# Patient Record
Sex: MALE | Race: WHITE | Employment: FULL TIME | URBAN - METROPOLITAN AREA
[De-identification: names, ages, dates, MRNs, and addresses within clinical notes are randomized per-mention and may not be internally consistent; named-entity substitution may affect disease eponyms.]

---

## 2021-11-16 ENCOUNTER — ANESTHESIA EVENT (OUTPATIENT)
Dept: SURGERY | Age: 61
End: 2021-11-16
Payer: COMMERCIAL

## 2021-11-17 ENCOUNTER — APPOINTMENT (OUTPATIENT)
Dept: GENERAL RADIOLOGY | Age: 61
End: 2021-11-17
Attending: ANESTHESIOLOGY
Payer: COMMERCIAL

## 2021-11-17 ENCOUNTER — HOSPITAL ENCOUNTER (OUTPATIENT)
Age: 61
Setting detail: OUTPATIENT SURGERY
Discharge: HOME | End: 2021-11-17
Attending: OPHTHALMOLOGY | Admitting: OPHTHALMOLOGY
Payer: COMMERCIAL

## 2021-11-17 ENCOUNTER — ANESTHESIA (OUTPATIENT)
Dept: SURGERY | Age: 61
End: 2021-11-17
Payer: COMMERCIAL

## 2021-11-17 VITALS
DIASTOLIC BLOOD PRESSURE: 66 MMHG | WEIGHT: 240 LBS | OXYGEN SATURATION: 100 % | RESPIRATION RATE: 17 BRPM | SYSTOLIC BLOOD PRESSURE: 127 MMHG | TEMPERATURE: 97.5 F | HEART RATE: 87 BPM | BODY MASS INDEX: 32.55 KG/M2

## 2021-11-17 LAB
GLUCOSE BLD STRIP.AUTO-MCNC: 214 MG/DL (ref 65–100)
SERVICE CMNT-IMP: ABNORMAL

## 2021-11-17 PROCEDURE — 77030018838 HC SOL IRR OPTH ALCN -B: Performed by: OPHTHALMOLOGY

## 2021-11-17 PROCEDURE — 2709999900 HC NON-CHARGEABLE SUPPLY: Performed by: OPHTHALMOLOGY

## 2021-11-17 PROCEDURE — 77030033576 HC PK VITRCMY TOT PLS ALCN -F: Performed by: OPHTHALMOLOGY

## 2021-11-17 PROCEDURE — 77030038275 HC DEV VIEW LENS DISP OCLS -B: Performed by: OPHTHALMOLOGY

## 2021-11-17 PROCEDURE — 77030003615 HC NDL OPHTH B&L -A: Performed by: OPHTHALMOLOGY

## 2021-11-17 PROCEDURE — 71045 X-RAY EXAM CHEST 1 VIEW: CPT

## 2021-11-17 PROCEDURE — 74011000636 HC RX REV CODE- 636: Performed by: OPHTHALMOLOGY

## 2021-11-17 PROCEDURE — 77030019908 HC STETH ESOPH SIMS -A: Performed by: ANESTHESIOLOGY

## 2021-11-17 PROCEDURE — 74011000250 HC RX REV CODE- 250: Performed by: NURSE ANESTHETIST, CERTIFIED REGISTERED

## 2021-11-17 PROCEDURE — 76210000020 HC REC RM PH II FIRST 0.5 HR: Performed by: OPHTHALMOLOGY

## 2021-11-17 PROCEDURE — 77030032623 HC KT VITRCMY TOT PLS ALCN -F: Performed by: OPHTHALMOLOGY

## 2021-11-17 PROCEDURE — 74011250636 HC RX REV CODE- 250/636: Performed by: OPHTHALMOLOGY

## 2021-11-17 PROCEDURE — 76010000172 HC OR TIME 2.5 TO 3 HR INTENSV-TIER 1: Performed by: OPHTHALMOLOGY

## 2021-11-17 PROCEDURE — 77030037088 HC TUBE ENDOTRACH ORAL NSL COVD-A: Performed by: ANESTHESIOLOGY

## 2021-11-17 PROCEDURE — 74011250637 HC RX REV CODE- 250/637: Performed by: ANESTHESIOLOGY

## 2021-11-17 PROCEDURE — 74011250636 HC RX REV CODE- 250/636: Performed by: NURSE ANESTHETIST, CERTIFIED REGISTERED

## 2021-11-17 PROCEDURE — 74011250636 HC RX REV CODE- 250/636

## 2021-11-17 PROCEDURE — 94002 VENT MGMT INPAT INIT DAY: CPT

## 2021-11-17 PROCEDURE — 74011000250 HC RX REV CODE- 250: Performed by: OPHTHALMOLOGY

## 2021-11-17 PROCEDURE — 77030020268 HC MISC GENERAL SUPPLY: Performed by: OPHTHALMOLOGY

## 2021-11-17 PROCEDURE — 77030039425 HC BLD LARYNG TRULITE DISP TELE -A: Performed by: ANESTHESIOLOGY

## 2021-11-17 PROCEDURE — 82962 GLUCOSE BLOOD TEST: CPT

## 2021-11-17 PROCEDURE — 76210000001 HC OR PH I REC 2.5 TO 3 HR: Performed by: OPHTHALMOLOGY

## 2021-11-17 PROCEDURE — 74011250636 HC RX REV CODE- 250/636: Performed by: ANESTHESIOLOGY

## 2021-11-17 PROCEDURE — 77030008547 HC TBNG OPHTH BD -A: Performed by: OPHTHALMOLOGY

## 2021-11-17 PROCEDURE — 77030021678 HC GLIDESCP STAT DISP VERT -B: Performed by: ANESTHESIOLOGY

## 2021-11-17 PROCEDURE — 76060000036 HC ANESTHESIA 2.5 TO 3 HR: Performed by: OPHTHALMOLOGY

## 2021-11-17 PROCEDURE — 77030032655 HC PRB LSR FLX ILLUM ALCON -C: Performed by: OPHTHALMOLOGY

## 2021-11-17 PROCEDURE — 77030040740 HC PK VITRCMY ULTRVIT ALCN -D: Performed by: OPHTHALMOLOGY

## 2021-11-17 RX ORDER — ACETAMINOPHEN 500 MG
1000 TABLET ORAL ONCE
Status: COMPLETED | OUTPATIENT
Start: 2021-11-17 | End: 2021-11-17

## 2021-11-17 RX ORDER — MIDAZOLAM HYDROCHLORIDE 1 MG/ML
2 INJECTION, SOLUTION INTRAMUSCULAR; INTRAVENOUS
Status: COMPLETED | OUTPATIENT
Start: 2021-11-17 | End: 2021-11-17

## 2021-11-17 RX ORDER — MIDAZOLAM HYDROCHLORIDE 1 MG/ML
INJECTION, SOLUTION INTRAMUSCULAR; INTRAVENOUS AS NEEDED
Status: DISCONTINUED | OUTPATIENT
Start: 2021-11-17 | End: 2021-11-17 | Stop reason: HOSPADM

## 2021-11-17 RX ORDER — LIDOCAINE HYDROCHLORIDE 20 MG/ML
INJECTION, SOLUTION EPIDURAL; INFILTRATION; INTRACAUDAL; PERINEURAL AS NEEDED
Status: DISCONTINUED | OUTPATIENT
Start: 2021-11-17 | End: 2021-11-17 | Stop reason: HOSPADM

## 2021-11-17 RX ORDER — SUCCINYLCHOLINE CHLORIDE 20 MG/ML
INJECTION INTRAMUSCULAR; INTRAVENOUS AS NEEDED
Status: DISCONTINUED | OUTPATIENT
Start: 2021-11-17 | End: 2021-11-17 | Stop reason: HOSPADM

## 2021-11-17 RX ORDER — SODIUM CHLORIDE, SODIUM LACTATE, POTASSIUM CHLORIDE, CALCIUM CHLORIDE 600; 310; 30; 20 MG/100ML; MG/100ML; MG/100ML; MG/100ML
75 INJECTION, SOLUTION INTRAVENOUS CONTINUOUS
Status: DISCONTINUED | OUTPATIENT
Start: 2021-11-17 | End: 2021-11-17 | Stop reason: HOSPADM

## 2021-11-17 RX ORDER — BETAMETHASONE SODIUM PHOSPHATE AND BETAMETHASONE ACETATE 3; 3 MG/ML; MG/ML
INJECTION, SUSPENSION INTRA-ARTICULAR; INTRALESIONAL; INTRAMUSCULAR; SOFT TISSUE AS NEEDED
Status: DISCONTINUED | OUTPATIENT
Start: 2021-11-17 | End: 2021-11-17 | Stop reason: HOSPADM

## 2021-11-17 RX ORDER — SODIUM CHLORIDE, SODIUM LACTATE, POTASSIUM CHLORIDE, CALCIUM CHLORIDE 600; 310; 30; 20 MG/100ML; MG/100ML; MG/100ML; MG/100ML
100 INJECTION, SOLUTION INTRAVENOUS CONTINUOUS
Status: DISCONTINUED | OUTPATIENT
Start: 2021-11-17 | End: 2021-11-17 | Stop reason: HOSPADM

## 2021-11-17 RX ORDER — EPHEDRINE SULFATE/0.9% NACL/PF 50 MG/5 ML
SYRINGE (ML) INTRAVENOUS AS NEEDED
Status: DISCONTINUED | OUTPATIENT
Start: 2021-11-17 | End: 2021-11-17 | Stop reason: HOSPADM

## 2021-11-17 RX ORDER — SODIUM CHLORIDE 9 MG/ML
INJECTION INTRAMUSCULAR; INTRAVENOUS; SUBCUTANEOUS AS NEEDED
Status: DISCONTINUED | OUTPATIENT
Start: 2021-11-17 | End: 2021-11-17 | Stop reason: HOSPADM

## 2021-11-17 RX ORDER — LIDOCAINE HYDROCHLORIDE 40 MG/ML
INJECTION, SOLUTION RETROBULBAR; TOPICAL AS NEEDED
Status: DISCONTINUED | OUTPATIENT
Start: 2021-11-17 | End: 2021-11-17 | Stop reason: HOSPADM

## 2021-11-17 RX ORDER — BUPIVACAINE HYDROCHLORIDE 7.5 MG/ML
INJECTION, SOLUTION EPIDURAL; RETROBULBAR AS NEEDED
Status: DISCONTINUED | OUTPATIENT
Start: 2021-11-17 | End: 2021-11-17 | Stop reason: HOSPADM

## 2021-11-17 RX ORDER — DIPHENHYDRAMINE HYDROCHLORIDE 50 MG/ML
12.5 INJECTION, SOLUTION INTRAMUSCULAR; INTRAVENOUS
Status: DISCONTINUED | OUTPATIENT
Start: 2021-11-17 | End: 2021-11-17 | Stop reason: HOSPADM

## 2021-11-17 RX ORDER — FENTANYL CITRATE 50 UG/ML
INJECTION, SOLUTION INTRAMUSCULAR; INTRAVENOUS AS NEEDED
Status: DISCONTINUED | OUTPATIENT
Start: 2021-11-17 | End: 2021-11-17 | Stop reason: HOSPADM

## 2021-11-17 RX ORDER — NALOXONE HYDROCHLORIDE 0.4 MG/ML
0.1 INJECTION, SOLUTION INTRAMUSCULAR; INTRAVENOUS; SUBCUTANEOUS
Status: DISCONTINUED | OUTPATIENT
Start: 2021-11-17 | End: 2021-11-17 | Stop reason: HOSPADM

## 2021-11-17 RX ORDER — ATROPINE SULFATE 10 MG/ML
1 SOLUTION/ DROPS OPHTHALMIC
Status: COMPLETED | OUTPATIENT
Start: 2021-11-17 | End: 2021-11-17

## 2021-11-17 RX ORDER — TROPICAMIDE 10 MG/ML
1 SOLUTION/ DROPS OPHTHALMIC
Status: COMPLETED | OUTPATIENT
Start: 2021-11-17 | End: 2021-11-17

## 2021-11-17 RX ORDER — HALOPERIDOL 5 MG/ML
1 INJECTION INTRAMUSCULAR
Status: DISCONTINUED | OUTPATIENT
Start: 2021-11-17 | End: 2021-11-17 | Stop reason: HOSPADM

## 2021-11-17 RX ORDER — LIDOCAINE HYDROCHLORIDE 10 MG/ML
0.1 INJECTION INFILTRATION; PERINEURAL AS NEEDED
Status: DISCONTINUED | OUTPATIENT
Start: 2021-11-17 | End: 2021-11-17 | Stop reason: HOSPADM

## 2021-11-17 RX ORDER — PROPOFOL 10 MG/ML
INJECTION, EMULSION INTRAVENOUS
Status: COMPLETED
Start: 2021-11-17 | End: 2021-11-17

## 2021-11-17 RX ORDER — ROCURONIUM BROMIDE 10 MG/ML
INJECTION, SOLUTION INTRAVENOUS AS NEEDED
Status: DISCONTINUED | OUTPATIENT
Start: 2021-11-17 | End: 2021-11-17 | Stop reason: HOSPADM

## 2021-11-17 RX ORDER — HYDROMORPHONE HYDROCHLORIDE 2 MG/ML
0.5 INJECTION, SOLUTION INTRAMUSCULAR; INTRAVENOUS; SUBCUTANEOUS
Status: DISCONTINUED | OUTPATIENT
Start: 2021-11-17 | End: 2021-11-17 | Stop reason: HOSPADM

## 2021-11-17 RX ORDER — PHENYLEPHRINE HYDROCHLORIDE 25 MG/ML
1 SOLUTION/ DROPS OPHTHALMIC
Status: COMPLETED | OUTPATIENT
Start: 2021-11-17 | End: 2021-11-17

## 2021-11-17 RX ORDER — MIDAZOLAM HYDROCHLORIDE 1 MG/ML
INJECTION, SOLUTION INTRAMUSCULAR; INTRAVENOUS
Status: COMPLETED
Start: 2021-11-17 | End: 2021-11-17

## 2021-11-17 RX ORDER — PROPOFOL 10 MG/ML
0-100 INJECTION, EMULSION INTRAVENOUS
Status: DISCONTINUED | OUTPATIENT
Start: 2021-11-17 | End: 2021-11-17 | Stop reason: HOSPADM

## 2021-11-17 RX ORDER — FLUMAZENIL 0.1 MG/ML
0.2 INJECTION INTRAVENOUS
Status: DISCONTINUED | OUTPATIENT
Start: 2021-11-17 | End: 2021-11-17 | Stop reason: HOSPADM

## 2021-11-17 RX ORDER — OXYCODONE HYDROCHLORIDE 5 MG/1
5 TABLET ORAL
Status: COMPLETED | OUTPATIENT
Start: 2021-11-17 | End: 2021-11-17

## 2021-11-17 RX ORDER — CEFAZOLIN SODIUM 1 G/3ML
INJECTION, POWDER, FOR SOLUTION INTRAMUSCULAR; INTRAVENOUS AS NEEDED
Status: DISCONTINUED | OUTPATIENT
Start: 2021-11-17 | End: 2021-11-17 | Stop reason: HOSPADM

## 2021-11-17 RX ORDER — PROPOFOL 10 MG/ML
INJECTION, EMULSION INTRAVENOUS AS NEEDED
Status: DISCONTINUED | OUTPATIENT
Start: 2021-11-17 | End: 2021-11-17 | Stop reason: HOSPADM

## 2021-11-17 RX ORDER — DEXAMETHASONE SODIUM PHOSPHATE 4 MG/ML
INJECTION, SOLUTION INTRA-ARTICULAR; INTRALESIONAL; INTRAMUSCULAR; INTRAVENOUS; SOFT TISSUE AS NEEDED
Status: DISCONTINUED | OUTPATIENT
Start: 2021-11-17 | End: 2021-11-17 | Stop reason: HOSPADM

## 2021-11-17 RX ADMIN — MIDAZOLAM 1 MG: 1 INJECTION INTRAMUSCULAR; INTRAVENOUS at 11:35

## 2021-11-17 RX ADMIN — FENTANYL CITRATE 100 MCG: 50 INJECTION INTRAMUSCULAR; INTRAVENOUS at 09:24

## 2021-11-17 RX ADMIN — Medication 10 MG: at 09:39

## 2021-11-17 RX ADMIN — SODIUM CHLORIDE, SODIUM LACTATE, POTASSIUM CHLORIDE, AND CALCIUM CHLORIDE 100 ML/HR: 600; 310; 30; 20 INJECTION, SOLUTION INTRAVENOUS at 08:12

## 2021-11-17 RX ADMIN — PHENYLEPHRINE HYDROCHLORIDE 100 MCG: 10 INJECTION INTRAVENOUS at 10:07

## 2021-11-17 RX ADMIN — SUGAMMADEX 200 MG: 100 INJECTION, SOLUTION INTRAVENOUS at 11:37

## 2021-11-17 RX ADMIN — ATROPINE SULFATE 1 DROP: 10 SOLUTION/ DROPS OPHTHALMIC at 08:04

## 2021-11-17 RX ADMIN — PROPOFOL 40 MG: 10 INJECTION, EMULSION INTRAVENOUS at 11:57

## 2021-11-17 RX ADMIN — PHENOL 1 SPRAY: 1.5 LIQUID ORAL at 14:29

## 2021-11-17 RX ADMIN — PHENYLEPHRINE HYDROCHLORIDE 50 MCG: 10 INJECTION INTRAVENOUS at 09:26

## 2021-11-17 RX ADMIN — PHENYLEPHRINE HYDROCHLORIDE 100 MCG: 10 INJECTION INTRAVENOUS at 10:39

## 2021-11-17 RX ADMIN — Medication 10 MG: at 10:21

## 2021-11-17 RX ADMIN — PHENYLEPHRINE HYDROCHLORIDE 100 MCG: 10 INJECTION INTRAVENOUS at 09:49

## 2021-11-17 RX ADMIN — Medication 10 MG: at 10:39

## 2021-11-17 RX ADMIN — SUCCINYLCHOLINE CHLORIDE 140 MG: 20 INJECTION, SOLUTION INTRAMUSCULAR; INTRAVENOUS at 09:25

## 2021-11-17 RX ADMIN — PHENYLEPHRINE HYDROCHLORIDE 1 DROP: 25 SOLUTION/ DROPS OPHTHALMIC at 08:04

## 2021-11-17 RX ADMIN — MIDAZOLAM 2 MG: 1 INJECTION INTRAMUSCULAR; INTRAVENOUS at 12:32

## 2021-11-17 RX ADMIN — TROPICAMIDE 1 DROP: 10 SOLUTION/ DROPS OPHTHALMIC at 07:59

## 2021-11-17 RX ADMIN — ACETAMINOPHEN 1000 MG: 500 TABLET ORAL at 08:12

## 2021-11-17 RX ADMIN — Medication 10 MG: at 09:55

## 2021-11-17 RX ADMIN — PHENYLEPHRINE HYDROCHLORIDE 100 MCG: 10 INJECTION INTRAVENOUS at 10:21

## 2021-11-17 RX ADMIN — ATROPINE SULFATE 1 DROP: 10 SOLUTION/ DROPS OPHTHALMIC at 07:59

## 2021-11-17 RX ADMIN — PHENYLEPHRINE HYDROCHLORIDE 150 MCG: 10 INJECTION INTRAVENOUS at 09:43

## 2021-11-17 RX ADMIN — TROPICAMIDE 1 DROP: 10 SOLUTION/ DROPS OPHTHALMIC at 08:09

## 2021-11-17 RX ADMIN — Medication 10 MG: at 09:43

## 2021-11-17 RX ADMIN — PHENYLEPHRINE HYDROCHLORIDE 1 DROP: 25 SOLUTION/ DROPS OPHTHALMIC at 07:59

## 2021-11-17 RX ADMIN — SODIUM CHLORIDE, SODIUM LACTATE, POTASSIUM CHLORIDE, AND CALCIUM CHLORIDE: 600; 310; 30; 20 INJECTION, SOLUTION INTRAVENOUS at 11:43

## 2021-11-17 RX ADMIN — MIDAZOLAM HYDROCHLORIDE 2 MG: 1 INJECTION, SOLUTION INTRAMUSCULAR; INTRAVENOUS at 12:32

## 2021-11-17 RX ADMIN — PHENYLEPHRINE HYDROCHLORIDE 100 MCG: 10 INJECTION INTRAVENOUS at 09:55

## 2021-11-17 RX ADMIN — PROPOFOL 40 MCG/KG/MIN: 10 INJECTION, EMULSION INTRAVENOUS at 12:25

## 2021-11-17 RX ADMIN — Medication 10 MG: at 09:49

## 2021-11-17 RX ADMIN — Medication 10 MG: at 10:07

## 2021-11-17 RX ADMIN — OXYCODONE 5 MG: 5 TABLET ORAL at 14:42

## 2021-11-17 RX ADMIN — ROCURONIUM BROMIDE 5 MG: 10 INJECTION, SOLUTION INTRAVENOUS at 09:25

## 2021-11-17 RX ADMIN — MIDAZOLAM 1 MG: 1 INJECTION INTRAMUSCULAR; INTRAVENOUS at 11:31

## 2021-11-17 RX ADMIN — PHENYLEPHRINE HYDROCHLORIDE 1 DROP: 25 SOLUTION/ DROPS OPHTHALMIC at 08:09

## 2021-11-17 RX ADMIN — MIDAZOLAM 2 MG: 1 INJECTION INTRAMUSCULAR; INTRAVENOUS at 09:00

## 2021-11-17 RX ADMIN — LIDOCAINE HYDROCHLORIDE 100 MG: 20 INJECTION, SOLUTION EPIDURAL; INFILTRATION; INTRACAUDAL; PERINEURAL at 09:25

## 2021-11-17 RX ADMIN — DEXAMETHASONE SODIUM PHOSPHATE 4 MG: 4 INJECTION, SOLUTION INTRAMUSCULAR; INTRAVENOUS at 09:54

## 2021-11-17 RX ADMIN — PROPOFOL 30 MCG/KG/MIN: 10 INJECTION, EMULSION INTRAVENOUS at 12:15

## 2021-11-17 RX ADMIN — HYDROMORPHONE HYDROCHLORIDE 0.5 MG: 2 INJECTION INTRAMUSCULAR; INTRAVENOUS; SUBCUTANEOUS at 14:14

## 2021-11-17 RX ADMIN — TROPICAMIDE 1 DROP: 10 SOLUTION/ DROPS OPHTHALMIC at 08:04

## 2021-11-17 RX ADMIN — PROPOFOL 160 MG: 10 INJECTION, EMULSION INTRAVENOUS at 09:25

## 2021-11-17 NOTE — DISCHARGE INSTRUCTIONS
[facility], [address] [place] [address] MD Evans Salas MD Kimber Quiver. MD Viraj Limon. Joan Freedman MD    [phone] or [phone] or [phone] or [phone]    Post Operative Instructions for Retina and Vitreous Surgery Patients    The following are a few guidelines that you should observe for two weeks after surgery:    1. Avoid stooping over, lifting heavy weights or bumping your head. 2.  Special positioning: DO NOT LAY FLAT ON YOUR BACK, SLEEP ON EITHER SIDE. 3.  No extensive traveling except what is necessary. 4.  You may watch television, read, cook and wash dishes as long as it does not interfere        with special positioning instructions. 5.  No strenuous activity or sexual intercourse. 6.  Some discharge from the operated eye is to be expected. This should gradually        improve. 7.  Wear the eye shield or glasses at all times until cleared by the doctor. 8.  If you experience increasing pain, decreasing vision, or pus like discharge, call the      Office. 9. BRING ALL EYE DROPS TO YOU POSTOPERATIVE APPOINTMENT! Medication Interaction:  During your procedure you potentially received a medication or medications which may reduce the effectiveness of oral contraceptives. Please consider other forms of contraception for 1 month following your procedure if you are currently using oral contraceptives as your primary form of birth control. In addition to this, we recommend continuing your oral contraceptive as prescribed, unless otherwise instructed by your physician, during this time.     After general anesthesia or intravenous sedation, for 24 hours or while taking prescription Narcotics:  · Limit your activities  · A responsible adult needs to be with you for the next 24 hours  · Do not drive and operate hazardous machinery  · Do not make important personal or business decisions  · Do not drink alcoholic beverages  · If you have not urinated within 8 hours after discharge, and you are experiencing discomfort from urinary retention, please go to the nearest ED. · If you have sleep apnea and have a CPAP machine, please use it for all naps and sleeping. · Please use caution when taking narcotics and any of your home medications that may cause drowsiness. *  Please give a list of your current medications to your Primary Care Provider. *  Please update this list whenever your medications are discontinued, doses are      changed, or new medications (including over-the-counter products) are added. *  Please carry medication information at all times in case of emergency situations. These are general instructions for a healthy lifestyle:  No smoking/ No tobacco products/ Avoid exposure to second hand smoke  Surgeon General's Warning:  Quitting smoking now greatly reduces serious risk to your health. Obesity, smoking, and sedentary lifestyle greatly increases your risk for illness  A healthy diet, regular physical exercise & weight monitoring are important for maintaining a healthy lifestyle    You may be retaining fluid if you have a history of heart failure or if you experience any of the following symptoms:  Weight gain of 3 pounds or more overnight or 5 pounds in a week, increased swelling in our hands or feet or shortness of breath while lying flat in bed. Please call your doctor as soon as you notice any of these symptoms; do not wait until your next office visit.

## 2021-11-17 NOTE — PROGRESS NOTES
Patient out from operating room and placed on the ventilator on documented settings. Patient is orally intubated with a # 8.0 ET Tube secured at the 25 cm latisha at the lip. Breath sounds are coarse. Trachea is midline. Negative for subcutaneous air, chest excursion is symmetric. Negative for pitting edema. Patient is also Negative for cyanosis. All alarms are set and audible. Resuscitation bag is at the head of the bed.       Ventilator Settings  Mode FIO2 Rate Tidal Volume Pressure PEEP I:E Ratio   Assist control, VC+  50 %    450 ml     8 cm H20         Peak airway pressure: 16 cm H2O   Minute ventilation: 11.1 l/min       Edu Ladd, RT

## 2021-11-17 NOTE — ANESTHESIA PREPROCEDURE EVALUATION
Anesthetic History   No history of anesthetic complications            Review of Systems / Medical History  Patient summary reviewed and pertinent labs reviewed    Pulmonary          Smoker (40+ pack year hx)         Neuro/Psych   Within defined limits           Cardiovascular    Hypertension: well controlled          PAD (Stents x 4 in LE. On Brilinta, but held perioperatively.)    Exercise tolerance: >4 METS  Comments: Denies any CP/SOB/Palpitations. GI/Hepatic/Renal  Within defined limits              Endo/Other    Diabetes: poorly controlled, type 2         Other Findings            Physical Exam    Airway  Mallampati: II  TM Distance: > 6 cm  Neck ROM: normal range of motion   Mouth opening: Normal    Comments: Full Beard Cardiovascular          Murmur: Grade 2, Aortic area     Dental  No notable dental hx       Pulmonary  Breath sounds clear to auscultation               Abdominal  GI exam deferred       Other Findings            Anesthetic Plan    ASA: 3  Anesthesia type: general          Induction: Intravenous  Anesthetic plan and risks discussed with: Patient      Plan for GETA. Discussed with patient that he needs to mention heart murmur to his PCP and be evaluated with echo.

## 2021-11-17 NOTE — OP NOTES
Operative Note    Patient: Yusra Bai  YOB: 1960  MRN: [id number]    Date of Procedure: 11/17/2021     Pre-Op Diagnosis:   1. Left epiretinal membrane [H35.372]  2. Proliferative diabetic retinopathy of left eye with macular edema associated with diabetes mellitus due to underlying condition ([facility] [E08.3512]  3. 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 Procedure(s):  LEFT VITRECTOMY POSTERIOR 25 GAUGE   LEFT EYE ENDOLASER/   LEFT EYE SCAR TISSUE REMOVAL - TRD repair  LEFT EYE ERM/ILM PEELING    Surgeon(s):  Brendan Parker MD    Surgical Assistant: None    Anesthesia: General     Estimated Blood Loss (mL): Minimal    Complications: None    Specimens: * No specimens in log *     Implants: * No implants in log *    Drains: * No LDAs found *    Findings: see note. DESCRIPTION OF THE PROCEDURE:  The patient and his wife were met in the preoperative holding area where the operative eye was marked. Informed consent was obtained and the patient was taken back to the operating room where a time-out was called confirming the proper patient, operative site, as well as procedure to be performed. Next, general anesthesia was obtained with the assistance of the anesthesiology team and the patient's eye was then prepped and draped in the usual sterile ophthalmic fashion, an eyelid speculum was placed between the eyelids of the operative eye. A cut down block was created inferonasally with ella scissors and a retrobulbar block consisting of a 50:50 mixture of 0.75% Marcaine and 4% lidocaine was administered on 19 gauge kevin molina cannula. A total of 3 mL was injected around the eye without any complications. An operating microscope outfitted with a [employer] viewing system was utilized for visualization throughout the case.   First, 25-gauge pars plana vitrectomy trocars were placed inferotemporally, superotemporally, and superonasally, 4 mm posterior to the limbus margin. The infusion was placed in the inferotemporal cannula and visualized directly prior to being turned on. The infusion line was then secured with steri-strips to the drape. Next, core vitrectomy was performed and examination during this time revealed proliferative diabetic retinopathy with tractional membranes emanating off the optic nerve into the superior arcade and nasally. There was tractional retinal detachment present. All anterior to posterior traction was then relieved 360 degrees with the vitrectomy cutter. Careful shaving of the vitreous base was then performed 360 degrees with the assistance of scleral depression. Next all fibrovascular tractional membranes and plaques were carefully sectioned, delaminated and removed from the surface of the retina using foreceps and the vitrectomy cutter. The traction was relieved nicely as a result. The hyaloid was elevated to the far periphery in all quadrants. Examination throughout this time revealed a dense epiretinal membrane with macular puckering overlying the posterior pole. Tissue Blue was infused into the eye. It was allowed to layer on the posterior pole of the eye for approximately 30 seconds before being completely flushed out. Next, a flat lens was placed on the eye and utilized for visualization throughout membrane peeling of the posterior pole. Sydney forceps were then utilized to carefully peel the ILM and ERM over the macula in a wide-margin fashion. This was done without any complications. Endolaser was then placed around all retinal breaks and areas of suspicion as well as 360 degrees at a power of 250-300 milliwatts, 100 milliseconds duration and 150 millisecond interval.  A total of 1120 laser spots were placed. The remainder of the scleral depressed examination was unremarkable. A partial air fluid exchange was then performed.  The cannulas were removed, checked, and found to be air tight.  The patient's intraocular pressure was found to be appropriate. Subconjunctival Ancef and betamethasone was then injected around the eye and the patient's eye was cleaned, patched and shielded with antibiotic ointment placed in the operative eye. The patient was awakened from surgery under the close monitoring of the anesthesiology team.  Of note, his anesthetic was very sluggish to resolve. The patient was then taken back to the recovery room in stable condition. They tolerated the procedure well without any complications.       Rhett Huerta MD        Electronically Signed by Natacha Manzano MD on 2021 at 11:48 AM

## 2021-11-17 NOTE — ANESTHESIA POSTPROCEDURE EVALUATION
Procedure(s):  LEFT VITRECTOMY POSTERIOR 25 GAUGE / LASER/ SCAR TISSUE REMOVAL. 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 See intraop quick notes for details. I had to keep him intubated for several hours after the surgery due to weakness. Very faint orbicularis occuli twitch and no ulnar twitch. (Checked several TOF monitors, changed batteries, and tested them on myself to make sure they were working). Anesthetic had been fully reversed and patient was making no spontaneous respiratory effort and seemed weak when trying to respond to commands. I sent a dibucaine number but it is a send out lab so won't result for 3-4 days per lab. VS were stable, patient was maintained on Propofol gtt with intermittent Versed boluses for amnesia in PACU. He was weaned to extubation several hours after the procedure. Pain is controlled, he has his full motor strength back and is safe for discharge. I discussed with patient and wife the events that transpired and the actions we took. I also prvoided them with a printout of pseudocholinesterase deficiency and will followup with a phone call once the lab has resulted.   Post anesthesia nausea and vomiting:  none      INITIAL Post-op Vital signs:   Vitals Value Taken Time   /63 11/17/21 1455   Temp 36.4 °C (97.5 °F) 11/17/21 1451   Pulse 81 11/17/21 1459   Resp 17 11/17/21 1451   SpO2 95 % 11/17/21 1459   Vitals shown include unvalidated device data.

## 2021-11-22 NOTE — PROGRESS NOTES
Opened patient's chart to follow-up on lab test for the Dibucaine number for Pseudocholinesterase Deficiency. Lab shows inadequate sample and that the test was cancelled. Called PAT to arrange for an outpatient lab draw and then called patient tonight to discuss and confirm. Patient mentioned that he finally got a hold of his sister and she had a surgery where she was \"paralyzed but could hear people talking\" which may also lend credence to a diagnosis of Hereditary Pseudocholinesterase Deficiency. As of my phone call with the patient, he had not heard from PAT but I will follow-up with them in the morning. He seems to understand the importance of getting this test done.

## 2021-11-24 ENCOUNTER — HOSPITAL ENCOUNTER (OUTPATIENT)
Dept: LAB | Age: 61
Discharge: HOME | End: 2021-11-24
Payer: COMMERCIAL

## 2021-11-24 PROCEDURE — 36415 COLL VENOUS BLD VENIPUNCTURE: CPT

## 2021-11-24 PROCEDURE — 82638 ASSAY OF DIBUCAINE NUMBER: CPT

## 2021-11-26 LAB
CHOLINESTERASE DIBUCAINE/CHE CFR SERPL: < 25 % INHIBITION (ref 70–90)
CHOLINESTERASE SERPL-CCNC: 72 U/L (ref 1801–3537)

## (undated) DEVICE — 3M™ STERI-DRAPE™ INSTRUMENT POUCH 1018: Brand: STERI-DRAPE™

## (undated) DEVICE — SOLUTION IRRIGATION BAL SALT SOLUTION 500 ML BTL 6/CA BSS +

## (undated) DEVICE — Device

## (undated) DEVICE — 3M™ STERI-STRIP™ REINFORCED ADHESIVE SKIN CLOSURES, R1547, 1/2 IN X 4 IN (12 MM X 100 MM), 6 STRIPS/ENVELOPE: Brand: 3M™ STERI-STRIP™

## (undated) DEVICE — IRRIGATION KT OPHTH 80IN --

## (undated) DEVICE — CONSTELLATION VISION SYSTEM 5000 CPM ULTRAVIT PROBE STRAIGHT ENDOILLUMINATOR 25+ TOTALPLUS VITRECTOMY PAK EDGEPLUS BLADE VALVED ENTRY SYSTEM: Brand: CONSTELLATION, ULTRAVIT, 25+, TOTALPLUS, EDGEPLUS

## (undated) DEVICE — SOLUTION,WATER,IRRIGATION,1000ML,STERILE: Brand: MEDLINE

## (undated) DEVICE — SYR 10ML LUER LOK 1/5ML GRAD --

## (undated) DEVICE — EYE SPEAR / FINE DISSECTOR: Brand: DEROYAL

## (undated) DEVICE — SURGICAL PROCEDURE PACK EYE CDS

## (undated) DEVICE — 25 GA ILLUMINATED FLEXIBLE CURVED LASER PROBE: Brand: ALCON, ENGAUGE

## (undated) DEVICE — NEEDLE HYPO 25GA L1.5IN BLU POLYPR HUB S STL REG BVL STR

## (undated) DEVICE — 1060 S-DRAPE SPCL INCISE 10/BX 4BX/CS: Brand: STERI-DRAPE™

## (undated) DEVICE — BETADINE 5% EYE SOL

## (undated) DEVICE — NEEDLE OPHTH ATKNSN 25 GAX38 MM RETROBULBAR

## (undated) DEVICE — COVER,MAYO STAND,STERILE: Brand: MEDLINE

## (undated) DEVICE — CARDINAL HEALTH FLEXIBLE LIGHT HANDLE COVER: Brand: CARDINAL HEALTH

## (undated) DEVICE — DRAPE TOWEL: Brand: CONVERTORS

## (undated) DEVICE — PAD,EYE,1-5/8X2 5/8,STERILE,LF,1/PK: Brand: MEDLINE

## (undated) DEVICE — LENS VITRCTMY OCU FLAT DISP

## (undated) DEVICE — CONSTELLATION VISION SYSTEM 7500 CPM ULTRAVIT PROBE STRAIGHT ENDOILLUMINATOR 25+ TOTALPLUS VITRECTOMY PAK EDGEPLUS BLADE VALVED ENTRY SYSTEM: Brand: CONSTELLATION, ULTRAVIT, 25+, TOTALPLUS, EDGEPLUS

## (undated) DEVICE — 25+* ULTRAVIT* VITRECTOMY PROBE BEVELED 25+* /10,000 CPM: Brand: CONSTELLATION, ULTRAVIT, ENGAUGE, 25+

## (undated) DEVICE — 3M™ STERI-DRAPE™ MEDIUM DRAPE WITH INCISE FILM AND POUCH 1061: Brand: STERI-DRAPE™